# Patient Record
Sex: FEMALE | HISPANIC OR LATINO | Employment: FULL TIME | URBAN - METROPOLITAN AREA
[De-identification: names, ages, dates, MRNs, and addresses within clinical notes are randomized per-mention and may not be internally consistent; named-entity substitution may affect disease eponyms.]

---

## 2024-06-15 ENCOUNTER — APPOINTMENT (OUTPATIENT)
Dept: RADIOLOGY | Facility: HOSPITAL | Age: 24
End: 2024-06-15
Payer: MEDICARE

## 2024-06-15 ENCOUNTER — HOSPITAL ENCOUNTER (EMERGENCY)
Facility: HOSPITAL | Age: 24
Discharge: HOME | End: 2024-06-16
Attending: EMERGENCY MEDICINE
Payer: MEDICARE
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
## 2024-06-15 DIAGNOSIS — S52.301A CLOSED FRACTURE OF SHAFT OF RIGHT RADIUS, UNSPECIFIED FRACTURE MORPHOLOGY, INITIAL ENCOUNTER: Primary | ICD-10-CM

## 2024-06-15 LAB
ANION GAP BLDV CALCULATED.4IONS-SCNC: 12 MMOL/L (ref 10–25)
ANION GAP BLDV CALCULATED.4IONS-SCNC: 12 MMOL/L (ref 10–25)
BASE EXCESS BLDV CALC-SCNC: -2.3 MMOL/L (ref -2–3)
BASE EXCESS BLDV CALC-SCNC: -2.3 MMOL/L (ref -2–3)
BASOPHILS # BLD AUTO: 0.04 X10*3/UL (ref 0–0.1)
BASOPHILS # BLD AUTO: 0.04 X10*3/UL (ref 0–0.1)
BASOPHILS NFR BLD AUTO: 0.6 %
BASOPHILS NFR BLD AUTO: 0.6 %
BODY TEMPERATURE: 37 DEGREES CELSIUS
BODY TEMPERATURE: 37 DEGREES CELSIUS
CA-I BLDV-SCNC: 1.17 MMOL/L (ref 1.1–1.33)
CA-I BLDV-SCNC: 1.17 MMOL/L (ref 1.1–1.33)
CHLORIDE BLDV-SCNC: 108 MMOL/L (ref 98–107)
CHLORIDE BLDV-SCNC: 108 MMOL/L (ref 98–107)
EOSINOPHIL # BLD AUTO: 0.13 X10*3/UL (ref 0–0.7)
EOSINOPHIL # BLD AUTO: 0.13 X10*3/UL (ref 0–0.7)
EOSINOPHIL NFR BLD AUTO: 1.8 %
EOSINOPHIL NFR BLD AUTO: 1.8 %
ERYTHROCYTE [DISTWIDTH] IN BLOOD BY AUTOMATED COUNT: 15.9 % (ref 11.5–14.5)
ERYTHROCYTE [DISTWIDTH] IN BLOOD BY AUTOMATED COUNT: 15.9 % (ref 11.5–14.5)
GLUCOSE BLDV-MCNC: 122 MG/DL (ref 74–99)
GLUCOSE BLDV-MCNC: 122 MG/DL (ref 74–99)
HCO3 BLDV-SCNC: 22.4 MMOL/L (ref 22–26)
HCO3 BLDV-SCNC: 22.4 MMOL/L (ref 22–26)
HCT VFR BLD AUTO: 29.1 % (ref 36–46)
HCT VFR BLD AUTO: 29.1 % (ref 36–46)
HCT VFR BLD EST: 30 % (ref 36–46)
HCT VFR BLD EST: 30 % (ref 36–46)
HGB BLD-MCNC: 9.8 G/DL (ref 12–16)
HGB BLD-MCNC: 9.8 G/DL (ref 12–16)
HGB BLDV-MCNC: 10.1 G/DL (ref 12–16)
HGB BLDV-MCNC: 10.1 G/DL (ref 12–16)
IMM GRANULOCYTES # BLD AUTO: 0.02 X10*3/UL (ref 0–0.7)
IMM GRANULOCYTES # BLD AUTO: 0.02 X10*3/UL (ref 0–0.7)
IMM GRANULOCYTES NFR BLD AUTO: 0.3 % (ref 0–0.9)
IMM GRANULOCYTES NFR BLD AUTO: 0.3 % (ref 0–0.9)
LACTATE BLDV-SCNC: 1.4 MMOL/L (ref 0.4–2)
LACTATE BLDV-SCNC: 1.4 MMOL/L (ref 0.4–2)
LYMPHOCYTES # BLD AUTO: 3.88 X10*3/UL (ref 1.2–4.8)
LYMPHOCYTES # BLD AUTO: 3.88 X10*3/UL (ref 1.2–4.8)
LYMPHOCYTES NFR BLD AUTO: 53.7 %
LYMPHOCYTES NFR BLD AUTO: 53.7 %
MCH RBC QN AUTO: 25.5 PG (ref 26–34)
MCH RBC QN AUTO: 25.5 PG (ref 26–34)
MCHC RBC AUTO-ENTMCNC: 33.7 G/DL (ref 32–36)
MCHC RBC AUTO-ENTMCNC: 33.7 G/DL (ref 32–36)
MCV RBC AUTO: 76 FL (ref 80–100)
MCV RBC AUTO: 76 FL (ref 80–100)
MONOCYTES # BLD AUTO: 0.43 X10*3/UL (ref 0.1–1)
MONOCYTES # BLD AUTO: 0.43 X10*3/UL (ref 0.1–1)
MONOCYTES NFR BLD AUTO: 6 %
MONOCYTES NFR BLD AUTO: 6 %
NEUTROPHILS # BLD AUTO: 2.72 X10*3/UL (ref 1.2–7.7)
NEUTROPHILS # BLD AUTO: 2.72 X10*3/UL (ref 1.2–7.7)
NEUTROPHILS NFR BLD AUTO: 37.6 %
NEUTROPHILS NFR BLD AUTO: 37.6 %
NRBC BLD-RTO: 0 /100 WBCS (ref 0–0)
NRBC BLD-RTO: 0 /100 WBCS (ref 0–0)
OXYHGB MFR BLDV: 71 % (ref 45–75)
OXYHGB MFR BLDV: 71 % (ref 45–75)
PCO2 BLDV: 37 MM HG (ref 41–51)
PCO2 BLDV: 37 MM HG (ref 41–51)
PH BLDV: 7.39 PH (ref 7.33–7.43)
PH BLDV: 7.39 PH (ref 7.33–7.43)
PLATELET # BLD AUTO: 262 X10*3/UL (ref 150–450)
PLATELET # BLD AUTO: 262 X10*3/UL (ref 150–450)
PO2 BLDV: 44 MM HG (ref 35–45)
PO2 BLDV: 44 MM HG (ref 35–45)
POTASSIUM BLDV-SCNC: 3.7 MMOL/L (ref 3.5–5.3)
POTASSIUM BLDV-SCNC: 3.7 MMOL/L (ref 3.5–5.3)
RBC # BLD AUTO: 3.84 X10*6/UL (ref 4–5.2)
RBC # BLD AUTO: 3.84 X10*6/UL (ref 4–5.2)
SAO2 % BLDV: 72 % (ref 45–75)
SAO2 % BLDV: 72 % (ref 45–75)
SODIUM BLDV-SCNC: 139 MMOL/L (ref 136–145)
SODIUM BLDV-SCNC: 139 MMOL/L (ref 136–145)
WBC # BLD AUTO: 7.2 X10*3/UL (ref 4.4–11.3)
WBC # BLD AUTO: 7.2 X10*3/UL (ref 4.4–11.3)

## 2024-06-15 PROCEDURE — G0390 TRAUMA RESPONS W/HOSP CRITI: HCPCS

## 2024-06-15 PROCEDURE — 25605 CLTX DST RDL FX/EPHYS SEP W/: CPT | Mod: RT

## 2024-06-15 PROCEDURE — 84702 CHORIONIC GONADOTROPIN TEST: CPT | Performed by: EMERGENCY MEDICINE

## 2024-06-15 PROCEDURE — 72128 CT CHEST SPINE W/O DYE: CPT | Performed by: RADIOLOGY

## 2024-06-15 PROCEDURE — 82435 ASSAY OF BLOOD CHLORIDE: CPT

## 2024-06-15 PROCEDURE — 82077 ASSAY SPEC XCP UR&BREATH IA: CPT | Performed by: EMERGENCY MEDICINE

## 2024-06-15 PROCEDURE — 71045 X-RAY EXAM CHEST 1 VIEW: CPT | Performed by: RADIOLOGY

## 2024-06-15 PROCEDURE — 36415 COLL VENOUS BLD VENIPUNCTURE: CPT | Performed by: EMERGENCY MEDICINE

## 2024-06-15 PROCEDURE — 72125 CT NECK SPINE W/O DYE: CPT

## 2024-06-15 PROCEDURE — 83605 ASSAY OF LACTIC ACID: CPT | Performed by: EMERGENCY MEDICINE

## 2024-06-15 PROCEDURE — 72131 CT LUMBAR SPINE W/O DYE: CPT | Performed by: RADIOLOGY

## 2024-06-15 PROCEDURE — 84075 ASSAY ALKALINE PHOSPHATASE: CPT | Performed by: EMERGENCY MEDICINE

## 2024-06-15 PROCEDURE — 84132 ASSAY OF SERUM POTASSIUM: CPT

## 2024-06-15 PROCEDURE — 86901 BLOOD TYPING SEROLOGIC RH(D): CPT | Performed by: EMERGENCY MEDICINE

## 2024-06-15 PROCEDURE — 84132 ASSAY OF SERUM POTASSIUM: CPT | Performed by: EMERGENCY MEDICINE

## 2024-06-15 PROCEDURE — 70450 CT HEAD/BRAIN W/O DYE: CPT

## 2024-06-15 PROCEDURE — 99285 EMERGENCY DEPT VISIT HI MDM: CPT | Mod: 25

## 2024-06-15 PROCEDURE — 80053 COMPREHEN METABOLIC PANEL: CPT

## 2024-06-15 PROCEDURE — 73090 X-RAY EXAM OF FOREARM: CPT | Mod: RT

## 2024-06-15 PROCEDURE — 99285 EMERGENCY DEPT VISIT HI MDM: CPT | Performed by: EMERGENCY MEDICINE

## 2024-06-15 PROCEDURE — 72125 CT NECK SPINE W/O DYE: CPT | Performed by: RADIOLOGY

## 2024-06-15 PROCEDURE — 2500000004 HC RX 250 GENERAL PHARMACY W/ HCPCS (ALT 636 FOR OP/ED): Performed by: EMERGENCY MEDICINE

## 2024-06-15 PROCEDURE — 2550000001 HC RX 255 CONTRASTS: Performed by: EMERGENCY MEDICINE

## 2024-06-15 PROCEDURE — 71260 CT THORAX DX C+: CPT | Performed by: RADIOLOGY

## 2024-06-15 PROCEDURE — 85025 COMPLETE CBC W/AUTO DIFF WBC: CPT | Performed by: EMERGENCY MEDICINE

## 2024-06-15 PROCEDURE — 72131 CT LUMBAR SPINE W/O DYE: CPT

## 2024-06-15 PROCEDURE — 72128 CT CHEST SPINE W/O DYE: CPT

## 2024-06-15 PROCEDURE — 73100 X-RAY EXAM OF WRIST: CPT | Mod: RIGHT SIDE | Performed by: RADIOLOGY

## 2024-06-15 PROCEDURE — 70450 CT HEAD/BRAIN W/O DYE: CPT | Performed by: RADIOLOGY

## 2024-06-15 PROCEDURE — 96374 THER/PROPH/DIAG INJ IV PUSH: CPT

## 2024-06-15 PROCEDURE — 71045 X-RAY EXAM CHEST 1 VIEW: CPT

## 2024-06-15 PROCEDURE — 82947 ASSAY GLUCOSE BLOOD QUANT: CPT | Performed by: EMERGENCY MEDICINE

## 2024-06-15 PROCEDURE — 72170 X-RAY EXAM OF PELVIS: CPT | Performed by: RADIOLOGY

## 2024-06-15 PROCEDURE — 73090 X-RAY EXAM OF FOREARM: CPT | Mod: RIGHT SIDE | Performed by: RADIOLOGY

## 2024-06-15 PROCEDURE — 74177 CT ABD & PELVIS W/CONTRAST: CPT | Performed by: RADIOLOGY

## 2024-06-15 PROCEDURE — 74177 CT ABD & PELVIS W/CONTRAST: CPT

## 2024-06-15 PROCEDURE — 96361 HYDRATE IV INFUSION ADD-ON: CPT

## 2024-06-15 PROCEDURE — 73100 X-RAY EXAM OF WRIST: CPT | Mod: RT

## 2024-06-15 PROCEDURE — 72170 X-RAY EXAM OF PELVIS: CPT

## 2024-06-15 RX ORDER — FENTANYL CITRATE 50 UG/ML
INJECTION, SOLUTION INTRAMUSCULAR; INTRAVENOUS
Status: COMPLETED
Start: 2024-06-15 | End: 2024-06-16

## 2024-06-15 RX ORDER — FENTANYL CITRATE 50 UG/ML
INJECTION, SOLUTION INTRAMUSCULAR; INTRAVENOUS CODE/TRAUMA/SEDATION MEDICATION
Status: COMPLETED | OUTPATIENT
Start: 2024-06-15 | End: 2024-06-15

## 2024-06-15 RX ORDER — FENTANYL CITRATE 50 UG/ML
50 INJECTION, SOLUTION INTRAMUSCULAR; INTRAVENOUS ONCE
Status: COMPLETED | OUTPATIENT
Start: 2024-06-15 | End: 2024-06-16

## 2024-06-15 RX ADMIN — IOHEXOL 100 ML: 350 INJECTION, SOLUTION INTRAVENOUS at 23:26

## 2024-06-15 RX ADMIN — SODIUM CHLORIDE, SODIUM LACTATE, POTASSIUM CHLORIDE, AND CALCIUM CHLORIDE 1000 ML: 600; 310; 30; 20 INJECTION, SOLUTION INTRAVENOUS at 23:11

## 2024-06-15 RX ADMIN — FENTANYL CITRATE 50 MCG: 50 INJECTION, SOLUTION INTRAMUSCULAR; INTRAVENOUS at 23:19

## 2024-06-15 NOTE — Clinical Note
Chyna Jaramillo was seen and treated in our emergency department on 6/15/2024.  She may return to work on 07/01/2024.  Should not return to work or use right hand until cleared by an orthopedic surgeon     If you have any questions or concerns, please don't hesitate to call.      Prince Dunn MD

## 2024-06-16 ENCOUNTER — APPOINTMENT (OUTPATIENT)
Dept: RADIOLOGY | Facility: HOSPITAL | Age: 24
End: 2024-06-16
Payer: MEDICARE

## 2024-06-16 VITALS
RESPIRATION RATE: 17 BRPM | RESPIRATION RATE: 17 BRPM | HEART RATE: 71 BPM | SYSTOLIC BLOOD PRESSURE: 95 MMHG | OXYGEN SATURATION: 100 % | DIASTOLIC BLOOD PRESSURE: 51 MMHG | SYSTOLIC BLOOD PRESSURE: 95 MMHG | DIASTOLIC BLOOD PRESSURE: 51 MMHG | TEMPERATURE: 97.8 F | HEART RATE: 71 BPM | OXYGEN SATURATION: 100 % | TEMPERATURE: 97.8 F

## 2024-06-16 LAB
ABO GROUP (TYPE) IN BLOOD: NORMAL
ABO GROUP (TYPE) IN BLOOD: NORMAL
ALBUMIN SERPL BCP-MCNC: 3.9 G/DL (ref 3.4–5)
ALBUMIN SERPL BCP-MCNC: 3.9 G/DL (ref 3.4–5)
ALP SERPL-CCNC: 48 U/L (ref 33–110)
ALP SERPL-CCNC: 48 U/L (ref 33–110)
ALT SERPL W P-5'-P-CCNC: 9 U/L (ref 7–45)
ALT SERPL W P-5'-P-CCNC: 9 U/L (ref 7–45)
ANION GAP SERPL CALC-SCNC: 13 MMOL/L (ref 10–20)
ANION GAP SERPL CALC-SCNC: 13 MMOL/L (ref 10–20)
ANTIBODY SCREEN: NORMAL
ANTIBODY SCREEN: NORMAL
AST SERPL W P-5'-P-CCNC: 17 U/L (ref 9–39)
AST SERPL W P-5'-P-CCNC: 17 U/L (ref 9–39)
B-HCG SERPL-ACNC: <3 MIU/ML
B-HCG SERPL-ACNC: <3 MIU/ML
BILIRUB SERPL-MCNC: 0.4 MG/DL (ref 0–1.2)
BILIRUB SERPL-MCNC: 0.4 MG/DL (ref 0–1.2)
BUN SERPL-MCNC: 12 MG/DL (ref 6–23)
BUN SERPL-MCNC: 12 MG/DL (ref 6–23)
CALCIUM SERPL-MCNC: 8.5 MG/DL (ref 8.6–10.6)
CALCIUM SERPL-MCNC: 8.5 MG/DL (ref 8.6–10.6)
CHLORIDE SERPL-SCNC: 110 MMOL/L (ref 98–107)
CHLORIDE SERPL-SCNC: 110 MMOL/L (ref 98–107)
CO2 SERPL-SCNC: 20 MMOL/L (ref 21–32)
CO2 SERPL-SCNC: 20 MMOL/L (ref 21–32)
CREAT SERPL-MCNC: 0.68 MG/DL (ref 0.5–1.05)
CREAT SERPL-MCNC: 0.68 MG/DL (ref 0.5–1.05)
EGFRCR SERPLBLD CKD-EPI 2021: >90 ML/MIN/1.73M*2
EGFRCR SERPLBLD CKD-EPI 2021: >90 ML/MIN/1.73M*2
ETHANOL SERPL-MCNC: <10 MG/DL
ETHANOL SERPL-MCNC: <10 MG/DL
GLUCOSE SERPL-MCNC: 124 MG/DL (ref 74–99)
GLUCOSE SERPL-MCNC: 124 MG/DL (ref 74–99)
INR PPP: 1.3 (ref 0.9–1.1)
INR PPP: 1.3 (ref 0.9–1.1)
LACTATE SERPL-SCNC: 1.5 MMOL/L (ref 0.4–2)
LACTATE SERPL-SCNC: 1.5 MMOL/L (ref 0.4–2)
POTASSIUM SERPL-SCNC: 3.6 MMOL/L (ref 3.5–5.3)
POTASSIUM SERPL-SCNC: 3.6 MMOL/L (ref 3.5–5.3)
PROT SERPL-MCNC: 6.6 G/DL (ref 6.4–8.2)
PROT SERPL-MCNC: 6.6 G/DL (ref 6.4–8.2)
PROTHROMBIN TIME: 14.5 SECONDS (ref 9.8–12.8)
PROTHROMBIN TIME: 14.5 SECONDS (ref 9.8–12.8)
RH FACTOR (ANTIGEN D): NORMAL
RH FACTOR (ANTIGEN D): NORMAL
SODIUM SERPL-SCNC: 139 MMOL/L (ref 136–145)
SODIUM SERPL-SCNC: 139 MMOL/L (ref 136–145)

## 2024-06-16 PROCEDURE — 73564 X-RAY EXAM KNEE 4 OR MORE: CPT | Mod: RT

## 2024-06-16 PROCEDURE — 25605 CLTX DST RDL FX/EPHYS SEP W/: CPT | Mod: RT

## 2024-06-16 PROCEDURE — 96376 TX/PRO/DX INJ SAME DRUG ADON: CPT

## 2024-06-16 PROCEDURE — 85610 PROTHROMBIN TIME: CPT | Performed by: EMERGENCY MEDICINE

## 2024-06-16 PROCEDURE — 36415 COLL VENOUS BLD VENIPUNCTURE: CPT | Performed by: EMERGENCY MEDICINE

## 2024-06-16 PROCEDURE — 2500000005 HC RX 250 GENERAL PHARMACY W/O HCPCS: Performed by: STUDENT IN AN ORGANIZED HEALTH CARE EDUCATION/TRAINING PROGRAM

## 2024-06-16 PROCEDURE — 73120 X-RAY EXAM OF HAND: CPT | Mod: RT

## 2024-06-16 PROCEDURE — 2500000004 HC RX 250 GENERAL PHARMACY W/ HCPCS (ALT 636 FOR OP/ED): Performed by: STUDENT IN AN ORGANIZED HEALTH CARE EDUCATION/TRAINING PROGRAM

## 2024-06-16 PROCEDURE — 2500000001 HC RX 250 WO HCPCS SELF ADMINISTERED DRUGS (ALT 637 FOR MEDICARE OP)

## 2024-06-16 PROCEDURE — 2500000004 HC RX 250 GENERAL PHARMACY W/ HCPCS (ALT 636 FOR OP/ED): Performed by: EMERGENCY MEDICINE

## 2024-06-16 PROCEDURE — 73564 X-RAY EXAM KNEE 4 OR MORE: CPT | Mod: RIGHT SIDE | Performed by: RADIOLOGY

## 2024-06-16 PROCEDURE — 96375 TX/PRO/DX INJ NEW DRUG ADDON: CPT

## 2024-06-16 PROCEDURE — 73070 X-RAY EXAM OF ELBOW: CPT | Mod: RT

## 2024-06-16 PROCEDURE — 73110 X-RAY EXAM OF WRIST: CPT | Mod: RT

## 2024-06-16 PROCEDURE — 73070 X-RAY EXAM OF ELBOW: CPT | Mod: RIGHT SIDE | Performed by: RADIOLOGY

## 2024-06-16 PROCEDURE — 99283 EMERGENCY DEPT VISIT LOW MDM: CPT

## 2024-06-16 RX ORDER — HYDROMORPHONE HYDROCHLORIDE 1 MG/ML
0.5 INJECTION, SOLUTION INTRAMUSCULAR; INTRAVENOUS; SUBCUTANEOUS ONCE
Status: COMPLETED | OUTPATIENT
Start: 2024-06-16 | End: 2024-06-16

## 2024-06-16 RX ORDER — OXYCODONE HYDROCHLORIDE 5 MG/1
5 TABLET ORAL EVERY 6 HOURS PRN
Qty: 12 TABLET | Refills: 0 | Status: SHIPPED | OUTPATIENT
Start: 2024-06-16 | End: 2024-06-16

## 2024-06-16 RX ORDER — OXYCODONE HYDROCHLORIDE 5 MG/1
TABLET ORAL
Status: COMPLETED
Start: 2024-06-16 | End: 2024-06-16

## 2024-06-16 RX ORDER — OXYCODONE HYDROCHLORIDE 5 MG/1
5 TABLET ORAL ONCE
Status: COMPLETED | OUTPATIENT
Start: 2024-06-16 | End: 2024-06-16

## 2024-06-16 RX ORDER — LIDOCAINE HYDROCHLORIDE 10 MG/ML
20 INJECTION INFILTRATION; PERINEURAL ONCE
Status: COMPLETED | OUTPATIENT
Start: 2024-06-16 | End: 2024-06-16

## 2024-06-16 RX ORDER — OXYCODONE HYDROCHLORIDE 5 MG/1
5 TABLET ORAL EVERY 6 HOURS PRN
Qty: 12 TABLET | Refills: 0 | Status: SHIPPED | OUTPATIENT
Start: 2024-06-16 | End: 2024-06-19

## 2024-06-16 RX ORDER — ACETAMINOPHEN 325 MG/1
975 TABLET ORAL ONCE
Status: COMPLETED | OUTPATIENT
Start: 2024-06-16 | End: 2024-06-16

## 2024-06-16 RX ORDER — ACETAMINOPHEN 325 MG/1
TABLET ORAL
Status: COMPLETED
Start: 2024-06-16 | End: 2024-06-16

## 2024-06-16 RX ADMIN — OXYCODONE HYDROCHLORIDE 5 MG: 5 TABLET ORAL at 04:51

## 2024-06-16 RX ADMIN — SODIUM CHLORIDE, POTASSIUM CHLORIDE, SODIUM LACTATE AND CALCIUM CHLORIDE 1000 ML: 600; 310; 30; 20 INJECTION, SOLUTION INTRAVENOUS at 00:05

## 2024-06-16 RX ADMIN — ACETAMINOPHEN 975 MG: 325 TABLET ORAL at 04:50

## 2024-06-16 RX ADMIN — LIDOCAINE HYDROCHLORIDE 200 MG: 10 INJECTION, SOLUTION INFILTRATION; PERINEURAL at 00:34

## 2024-06-16 RX ADMIN — FENTANYL CITRATE 50 MCG: 50 INJECTION, SOLUTION INTRAMUSCULAR; INTRAVENOUS at 00:03

## 2024-06-16 RX ADMIN — HYDROMORPHONE HYDROCHLORIDE 0.5 MG: 1 INJECTION, SOLUTION INTRAMUSCULAR; INTRAVENOUS; SUBCUTANEOUS at 00:34

## 2024-06-16 ASSESSMENT — ENCOUNTER SYMPTOMS
SHORTNESS OF BREATH: 0
CONFUSION: 0
RHINORRHEA: 0
ABDOMINAL DISTENTION: 0
EYE DISCHARGE: 0
ABDOMINAL PAIN: 0
AGITATION: 0
EYE PAIN: 0
CHEST TIGHTNESS: 0
NECK PAIN: 0
BACK PAIN: 0

## 2024-06-16 ASSESSMENT — PAIN DESCRIPTION - PROGRESSION: CLINICAL_PROGRESSION: GRADUALLY WORSENING

## 2024-06-16 ASSESSMENT — COLUMBIA-SUICIDE SEVERITY RATING SCALE - C-SSRS
2. HAVE YOU ACTUALLY HAD ANY THOUGHTS OF KILLING YOURSELF?: NO
1. IN THE PAST MONTH, HAVE YOU WISHED YOU WERE DEAD OR WISHED YOU COULD GO TO SLEEP AND NOT WAKE UP?: NO
6. HAVE YOU EVER DONE ANYTHING, STARTED TO DO ANYTHING, OR PREPARED TO DO ANYTHING TO END YOUR LIFE?: NO

## 2024-06-16 ASSESSMENT — PAIN DESCRIPTION - LOCATION: LOCATION: WRIST

## 2024-06-16 ASSESSMENT — LIFESTYLE VARIABLES
TOTAL SCORE: 0
HAVE PEOPLE ANNOYED YOU BY CRITICIZING YOUR DRINKING: NO
EVER HAD A DRINK FIRST THING IN THE MORNING TO STEADY YOUR NERVES TO GET RID OF A HANGOVER: NO
EVER FELT BAD OR GUILTY ABOUT YOUR DRINKING: NO
HAVE YOU EVER FELT YOU SHOULD CUT DOWN ON YOUR DRINKING: NO

## 2024-06-16 ASSESSMENT — PAIN SCALES - GENERAL: PAINLEVEL_OUTOF10: 8

## 2024-06-16 ASSESSMENT — PAIN - FUNCTIONAL ASSESSMENT: PAIN_FUNCTIONAL_ASSESSMENT: 0-10

## 2024-06-16 ASSESSMENT — PAIN DESCRIPTION - ORIENTATION: ORIENTATION: RIGHT
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
## 2024-06-16 NOTE — PROGRESS NOTES
Clinical Event Note    Patient had been discharged from the [facility] ED overnight last night. Patient called the ED today reporting that their pharmacy claimed they never received the script for oxycodone that had been written for the patient upon discharge. I reviewed the EMR and saw that Dr. Prince Dunn had electronically prescribed the patient a course of oxycodone IR 5 mg that had been sent to [facility] [address] [place] [address] Road this morning at 4:02 AM. I called and spoke with the [facility] and they stated that they had not received any electronic prescriptions on the patient. Because they had not received any scripts, I resubmitted the script for oxycodone electronically and the [facility] confirmed while I was still on the phone that they had received my script electronically.    Joseline Pressley MD  ED Attending
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
## 2024-06-28 ENCOUNTER — HOSPITAL ENCOUNTER (OUTPATIENT)
Dept: RADIOLOGY | Facility: CLINIC | Age: 24
Discharge: HOME | End: 2024-06-28
Payer: COMMERCIAL

## 2024-06-28 ENCOUNTER — OFFICE VISIT (OUTPATIENT)
Dept: ORTHOPEDIC SURGERY | Facility: CLINIC | Age: 24
End: 2024-06-28
Payer: COMMERCIAL

## 2024-06-28 ENCOUNTER — APPOINTMENT (OUTPATIENT)
Dept: ORTHOPEDIC SURGERY | Facility: CLINIC | Age: 24
End: 2024-06-28
Payer: COMMERCIAL

## 2024-06-28 DIAGNOSIS — S52.501A CLOSED FRACTURE OF DISTAL ENDS OF RIGHT RADIUS AND ULNA, INITIAL ENCOUNTER: Primary | ICD-10-CM

## 2024-06-28 DIAGNOSIS — S52.601A CLOSED FRACTURE OF DISTAL ENDS OF RIGHT RADIUS AND ULNA, INITIAL ENCOUNTER: ICD-10-CM

## 2024-06-28 DIAGNOSIS — S52.601A CLOSED FRACTURE OF DISTAL ENDS OF RIGHT RADIUS AND ULNA, INITIAL ENCOUNTER: Primary | ICD-10-CM

## 2024-06-28 DIAGNOSIS — S52.501A CLOSED FRACTURE OF DISTAL ENDS OF RIGHT RADIUS AND ULNA, INITIAL ENCOUNTER: ICD-10-CM

## 2024-06-28 PROCEDURE — 73110 X-RAY EXAM OF WRIST: CPT | Mod: RT

## 2024-06-28 PROCEDURE — 99203 OFFICE O/P NEW LOW 30 MIN: CPT | Performed by: STUDENT IN AN ORGANIZED HEALTH CARE EDUCATION/TRAINING PROGRAM

## 2024-06-28 ASSESSMENT — PAIN SCALES - GENERAL: PAINLEVEL_OUTOF10: 0 - NO PAIN

## 2024-06-28 ASSESSMENT — PAIN DESCRIPTION - DESCRIPTORS: DESCRIPTORS: ACHING

## 2024-06-28 ASSESSMENT — PAIN - FUNCTIONAL ASSESSMENT: PAIN_FUNCTIONAL_ASSESSMENT: 0-10
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
## 2024-07-01 DIAGNOSIS — S52.601A CLOSED FRACTURE OF RIGHT DISTAL RADIUS AND ULNA, INITIAL ENCOUNTER: Primary | ICD-10-CM

## 2024-07-01 DIAGNOSIS — S52.501A CLOSED FRACTURE OF RIGHT DISTAL RADIUS AND ULNA, INITIAL ENCOUNTER: Primary | ICD-10-CM

## 2024-07-02 ENCOUNTER — HOSPITAL ENCOUNTER (OUTPATIENT)
Dept: RADIOLOGY | Facility: CLINIC | Age: 24
Discharge: HOME | End: 2024-07-02
Payer: COMMERCIAL

## 2024-07-02 ENCOUNTER — CLINICAL SUPPORT (OUTPATIENT)
Dept: PREADMISSION TESTING | Facility: HOSPITAL | Age: 24
End: 2024-07-02
Payer: COMMERCIAL

## 2024-07-02 VITALS — HEIGHT: 67 IN | WEIGHT: 120 LBS | BODY MASS INDEX: 18.83 KG/M2

## 2024-07-02 DIAGNOSIS — S52.501A CLOSED FRACTURE OF RIGHT DISTAL RADIUS AND ULNA, INITIAL ENCOUNTER: ICD-10-CM

## 2024-07-02 DIAGNOSIS — S52.601A CLOSED FRACTURE OF RIGHT DISTAL RADIUS AND ULNA, INITIAL ENCOUNTER: ICD-10-CM

## 2024-07-02 PROCEDURE — 73200 CT UPPER EXTREMITY W/O DYE: CPT | Mod: RIGHT SIDE | Performed by: RADIOLOGY

## 2024-07-02 PROCEDURE — 73200 CT UPPER EXTREMITY W/O DYE: CPT | Mod: RT

## 2024-07-02 NOTE — PERIOPERATIVE NURSING NOTE
PAT nurse screening call completed; chart updated per information provided by patient. Education/ instructions reviewed; patient denied further questions or concerns. Patient aware of upcoming OR date on 7-5-24

## 2024-07-02 NOTE — PREPROCEDURE INSTRUCTIONS
No outpatient medications have been marked as taking for the 7/2/24 encounter (Clinical Support) with [facility].                       NPO Instructions:    Do not eat any food after midnight the night before your surgery/procedure.    Additional Instructions:     Three Days before Surgery:  Review your medication instructions, stop indicated medications  The Day before Surgery:  Review your medication instructions, stop indicated medications  You will be contacted regarding the time of your arrival to facility and surgery time  Do not eat any food after Midnight  Day of Surgery:  Review your medication instructions, take indicated medications  Wear  comfortable loose fitting clothing  Do not use moisturizers, creams, lotions or perfume  All jewelry and valuables should be left at home    PAT PRE-OPERATIVE INSTRUCTIONS    [facility]  [address] [place] [address]  [phone]    Please let your surgeon know if:      You develop:  Open sores, shingles, burning or painful urination as these may increase your risk of an infection.   Fever=100.4 or greater   New or worsening cold or flu symptoms ( cough, shortness of breath, sore throat, respiratory distress, headache, fatigue, GI symptoms)   You no longer wish to have the surgery.   Any other personal circumstances change that may lead to the need to cancel or defer this surgery-such as being sick or getting admitted to any hospital within one week of your planned procedure.    Your contact details change, such as a change of address or phone number.    Starting now:     Please DO NOT drink alcohol or smoke for 24 hours before surgery. It is well known that quitting smoking can make a huge difference to your health and recovery from surgery. The longer you abstain from smoking, the better your chances of a healthy recovery. If you need help with quitting, call 1-800-QUIT-NOW to be connected to a trained counselor who  will discuss the best methods to help you quit.     Before your surgery:    Please stop all supplements/ vitamins 7 days prior to surgery (or as directed by your surgeon).   Please stop taking NSAID pain medicine such as Advil, Ibuprofen, and Motrin 7 days before surgery.    If you develop any fever, cough, cold, rashes, cuts, scratches, scrapes, urinary symptoms or infection anywhere on your body (including teeth and gums) prior to surgery, please call your surgeon’s office as soon as possible. This may require treatment to reduce the chance of cancellation on the day of surgery.    The day before your surgery:   DIET- Do not eat any food after MIDNIGHT.   Get a good night’s rest.  Use the special soap for bathing if you have been instructed to use one.    Scheduled surgery times may change and you will be notified if this occurs - please check your personal voicemail for any updates.     On the morning of surgery:   Wear comfortable, loose fitting clothes which open in the front.   Shower and please do not wear moisturizers, creams, lotions, deodorants, makeup or perfume.    Please bring with you to surgery:   Photo ID and insurance card   Current list of medicines and allergies   Pacemaker/ Defibrillator/Heart stent cards as well as remote controls for implanted devices    CPAP machine and mask    Slings/ splints/ crutches   A copy of your complete advanced directive/DHPOA.    Please do NOT bring with you to surgery:   All jewelry and valuables should be left at home.   Prosthetic devices such as contact lenses, glasses, hearing aids, dentures, eyelash extensions, hairpins and body piercings must be removed prior to going in to the surgical suite. If you have a case for these items, please bring it with you on surgery day.    *Patients under the age of 18: A responsible adult must be present and remaining in the building throughout the surgical visit.    After outpatient surgery:   A responsible adult MUST  accompany you at the time of discharge and stay with you for 24 hours after your surgery. You may NOT drive yourself home after surgery.    Do not drive, operate machinery, make critical decisions or do activities that require co-ordination or balance until after a night’s sleep.   Do not drink alcoholic beverages for 24 hours.   Instructions for resuming your medications will be provided by your surgeon.    CALL YOUR DOCTOR AFTER SURGERY IF YOU HAVE:     Chills and/or a fever of 101° F or higher.    Redness, swelling, pus or drainage from your surgical wound or a bad smell from the wound.    Lightheadedness, fainting or confusion.    Persistent vomiting (throwing up) and are not able to eat or drink for 12 hours.    Three or more loose, watery bowel movements in 24 hours (diarrhea).   Difficulty or pain while urinating( after non-urological surgery)    Pain and swelling in your legs, especially if it is only on one side.    Difficulty breathing or are breathing faster than normal.    Any new concerning symptoms.      Reviewed pre-op instructions with patient, states understanding and denies further questions at this time.      Take Care Chyna

## 2024-07-05 ENCOUNTER — APPOINTMENT (OUTPATIENT)
Dept: RADIOLOGY | Facility: HOSPITAL | Age: 24
End: 2024-07-05
Payer: COMMERCIAL

## 2024-07-05 ENCOUNTER — ANESTHESIA EVENT (OUTPATIENT)
Dept: OPERATING ROOM | Facility: HOSPITAL | Age: 24
End: 2024-07-05
Payer: COMMERCIAL

## 2024-07-05 ENCOUNTER — ANESTHESIA (OUTPATIENT)
Dept: OPERATING ROOM | Facility: HOSPITAL | Age: 24
End: 2024-07-05
Payer: COMMERCIAL

## 2024-07-05 ENCOUNTER — HOSPITAL ENCOUNTER (OUTPATIENT)
Facility: HOSPITAL | Age: 24
Setting detail: OUTPATIENT SURGERY
Discharge: HOME | End: 2024-07-05
Attending: STUDENT IN AN ORGANIZED HEALTH CARE EDUCATION/TRAINING PROGRAM | Admitting: STUDENT IN AN ORGANIZED HEALTH CARE EDUCATION/TRAINING PROGRAM
Payer: COMMERCIAL

## 2024-07-05 VITALS
RESPIRATION RATE: 15 BRPM | SYSTOLIC BLOOD PRESSURE: 116 MMHG | TEMPERATURE: 97.3 F | DIASTOLIC BLOOD PRESSURE: 66 MMHG | HEART RATE: 92 BPM | OXYGEN SATURATION: 99 % | BODY MASS INDEX: 18.51 KG/M2 | HEIGHT: 67 IN | WEIGHT: 117.9 LBS

## 2024-07-05 DIAGNOSIS — S52.501A CLOSED FRACTURE OF RIGHT DISTAL RADIUS AND ULNA, INITIAL ENCOUNTER: Primary | ICD-10-CM

## 2024-07-05 DIAGNOSIS — S52.601A CLOSED FRACTURE OF RIGHT DISTAL RADIUS AND ULNA, INITIAL ENCOUNTER: Primary | ICD-10-CM

## 2024-07-05 LAB — HCG UR QL IA.RAPID: NEGATIVE

## 2024-07-05 PROCEDURE — 25609 OPTX DST RD XART FX/EP SEP3+: CPT | Performed by: STUDENT IN AN ORGANIZED HEALTH CARE EDUCATION/TRAINING PROGRAM

## 2024-07-05 PROCEDURE — 7100000002 HC RECOVERY ROOM TIME - EACH INCREMENTAL 1 MINUTE: Performed by: STUDENT IN AN ORGANIZED HEALTH CARE EDUCATION/TRAINING PROGRAM

## 2024-07-05 PROCEDURE — 2500000004 HC RX 250 GENERAL PHARMACY W/ HCPCS (ALT 636 FOR OP/ED): Performed by: STUDENT IN AN ORGANIZED HEALTH CARE EDUCATION/TRAINING PROGRAM

## 2024-07-05 PROCEDURE — 3600000009 HC OR TIME - EACH INCREMENTAL 1 MINUTE - PROCEDURE LEVEL FOUR: Performed by: STUDENT IN AN ORGANIZED HEALTH CARE EDUCATION/TRAINING PROGRAM

## 2024-07-05 PROCEDURE — C1769 GUIDE WIRE: HCPCS | Performed by: STUDENT IN AN ORGANIZED HEALTH CARE EDUCATION/TRAINING PROGRAM

## 2024-07-05 PROCEDURE — 7100000009 HC PHASE TWO TIME - INITIAL BASE CHARGE: Performed by: STUDENT IN AN ORGANIZED HEALTH CARE EDUCATION/TRAINING PROGRAM

## 2024-07-05 PROCEDURE — 3700000002 HC GENERAL ANESTHESIA TIME - EACH INCREMENTAL 1 MINUTE: Performed by: STUDENT IN AN ORGANIZED HEALTH CARE EDUCATION/TRAINING PROGRAM

## 2024-07-05 PROCEDURE — 3700000001 HC GENERAL ANESTHESIA TIME - INITIAL BASE CHARGE: Performed by: STUDENT IN AN ORGANIZED HEALTH CARE EDUCATION/TRAINING PROGRAM

## 2024-07-05 PROCEDURE — C1713 ANCHOR/SCREW BN/BN,TIS/BN: HCPCS | Performed by: STUDENT IN AN ORGANIZED HEALTH CARE EDUCATION/TRAINING PROGRAM

## 2024-07-05 PROCEDURE — 2500000005 HC RX 250 GENERAL PHARMACY W/O HCPCS: Performed by: STUDENT IN AN ORGANIZED HEALTH CARE EDUCATION/TRAINING PROGRAM

## 2024-07-05 PROCEDURE — 2720000007 HC OR 272 NO HCPCS: Performed by: STUDENT IN AN ORGANIZED HEALTH CARE EDUCATION/TRAINING PROGRAM

## 2024-07-05 PROCEDURE — 2780000003 HC OR 278 NO HCPCS: Performed by: STUDENT IN AN ORGANIZED HEALTH CARE EDUCATION/TRAINING PROGRAM

## 2024-07-05 PROCEDURE — 7100000001 HC RECOVERY ROOM TIME - INITIAL BASE CHARGE: Performed by: STUDENT IN AN ORGANIZED HEALTH CARE EDUCATION/TRAINING PROGRAM

## 2024-07-05 PROCEDURE — 81025 URINE PREGNANCY TEST: CPT | Performed by: STUDENT IN AN ORGANIZED HEALTH CARE EDUCATION/TRAINING PROGRAM

## 2024-07-05 PROCEDURE — 7100000010 HC PHASE TWO TIME - EACH INCREMENTAL 1 MINUTE: Performed by: STUDENT IN AN ORGANIZED HEALTH CARE EDUCATION/TRAINING PROGRAM

## 2024-07-05 PROCEDURE — 3600000004 HC OR TIME - INITIAL BASE CHARGE - PROCEDURE LEVEL FOUR: Performed by: STUDENT IN AN ORGANIZED HEALTH CARE EDUCATION/TRAINING PROGRAM

## 2024-07-05 DEVICE — SCREW, VA 2.4 X 16 LOCK STARDRIVE: Type: IMPLANTABLE DEVICE | Site: WRIST | Status: FUNCTIONAL

## 2024-07-05 DEVICE — IMPLANTABLE DEVICE: Type: IMPLANTABLE DEVICE | Site: WRIST | Status: FUNCTIONAL

## 2024-07-05 DEVICE — SCREW, CORTEX SELF TAPPING T8 SDR 2.4MM X 16MM: Type: IMPLANTABLE DEVICE | Site: WRIST | Status: FUNCTIONAL

## 2024-07-05 DEVICE — SCREW, VA 2.4 X 18 LOCK STARDRIVE: Type: IMPLANTABLE DEVICE | Site: WRIST | Status: FUNCTIONAL

## 2024-07-05 DEVICE — SCREW, CORTEX SELF TAPPING T8 SDR 2.4MM X 12MM: Type: IMPLANTABLE DEVICE | Site: WRIST | Status: FUNCTIONAL

## 2024-07-05 DEVICE — GUIDEWIRE, 1.1MM X 150MM. TROCAR TIP: Type: IMPLANTABLE DEVICE | Site: WRIST | Status: FUNCTIONAL

## 2024-07-05 RX ORDER — IPRATROPIUM BROMIDE 0.5 MG/2.5ML
500 SOLUTION RESPIRATORY (INHALATION) AS NEEDED
Status: DISCONTINUED | OUTPATIENT
Start: 2024-07-05 | End: 2024-07-05 | Stop reason: HOSPADM

## 2024-07-05 RX ORDER — ONDANSETRON HYDROCHLORIDE 2 MG/ML
4 INJECTION, SOLUTION INTRAVENOUS ONCE AS NEEDED
Status: DISCONTINUED | OUTPATIENT
Start: 2024-07-05 | End: 2024-07-05 | Stop reason: HOSPADM

## 2024-07-05 RX ORDER — FENTANYL CITRATE 50 UG/ML
50 INJECTION, SOLUTION INTRAMUSCULAR; INTRAVENOUS ONCE
Status: COMPLETED | OUTPATIENT
Start: 2024-07-05 | End: 2024-07-05

## 2024-07-05 RX ORDER — LABETALOL HYDROCHLORIDE 5 MG/ML
5 INJECTION, SOLUTION INTRAVENOUS ONCE AS NEEDED
Status: DISCONTINUED | OUTPATIENT
Start: 2024-07-05 | End: 2024-07-05 | Stop reason: HOSPADM

## 2024-07-05 RX ORDER — PROCHLORPERAZINE EDISYLATE 5 MG/ML
5 INJECTION INTRAMUSCULAR; INTRAVENOUS ONCE AS NEEDED
Status: DISCONTINUED | OUTPATIENT
Start: 2024-07-05 | End: 2024-07-05 | Stop reason: HOSPADM

## 2024-07-05 RX ORDER — HYDRALAZINE HYDROCHLORIDE 20 MG/ML
5 INJECTION INTRAMUSCULAR; INTRAVENOUS EVERY 30 MIN PRN
Status: DISCONTINUED | OUTPATIENT
Start: 2024-07-05 | End: 2024-07-05 | Stop reason: HOSPADM

## 2024-07-05 RX ORDER — FENTANYL CITRATE 50 UG/ML
25 INJECTION, SOLUTION INTRAMUSCULAR; INTRAVENOUS EVERY 5 MIN PRN
Status: DISCONTINUED | OUTPATIENT
Start: 2024-07-05 | End: 2024-07-05 | Stop reason: HOSPADM

## 2024-07-05 RX ORDER — FENTANYL CITRATE 50 UG/ML
50 INJECTION, SOLUTION INTRAMUSCULAR; INTRAVENOUS EVERY 5 MIN PRN
Status: DISCONTINUED | OUTPATIENT
Start: 2024-07-05 | End: 2024-07-05 | Stop reason: HOSPADM

## 2024-07-05 RX ORDER — ALBUTEROL SULFATE 0.83 MG/ML
2.5 SOLUTION RESPIRATORY (INHALATION) ONCE AS NEEDED
Status: DISCONTINUED | OUTPATIENT
Start: 2024-07-05 | End: 2024-07-05 | Stop reason: HOSPADM

## 2024-07-05 RX ORDER — ONDANSETRON HYDROCHLORIDE 2 MG/ML
INJECTION, SOLUTION INTRAVENOUS AS NEEDED
Status: DISCONTINUED | OUTPATIENT
Start: 2024-07-05 | End: 2024-07-05

## 2024-07-05 RX ORDER — DIPHENHYDRAMINE HYDROCHLORIDE 50 MG/ML
12.5 INJECTION INTRAMUSCULAR; INTRAVENOUS ONCE AS NEEDED
Status: DISCONTINUED | OUTPATIENT
Start: 2024-07-05 | End: 2024-07-05 | Stop reason: HOSPADM

## 2024-07-05 RX ORDER — LIDOCAINE HYDROCHLORIDE 10 MG/ML
INJECTION, SOLUTION EPIDURAL; INFILTRATION; INTRACAUDAL; PERINEURAL AS NEEDED
Status: DISCONTINUED | OUTPATIENT
Start: 2024-07-05 | End: 2024-07-05

## 2024-07-05 RX ORDER — MEPERIDINE HYDROCHLORIDE 25 MG/ML
12.5 INJECTION INTRAMUSCULAR; INTRAVENOUS; SUBCUTANEOUS EVERY 10 MIN PRN
Status: DISCONTINUED | OUTPATIENT
Start: 2024-07-05 | End: 2024-07-05 | Stop reason: HOSPADM

## 2024-07-05 RX ORDER — PROPOFOL 10 MG/ML
INJECTION, EMULSION INTRAVENOUS AS NEEDED
Status: DISCONTINUED | OUTPATIENT
Start: 2024-07-05 | End: 2024-07-05

## 2024-07-05 RX ORDER — SODIUM CHLORIDE, SODIUM LACTATE, POTASSIUM CHLORIDE, CALCIUM CHLORIDE 600; 310; 30; 20 MG/100ML; MG/100ML; MG/100ML; MG/100ML
100 INJECTION, SOLUTION INTRAVENOUS CONTINUOUS
Status: DISCONTINUED | OUTPATIENT
Start: 2024-07-05 | End: 2024-07-05 | Stop reason: HOSPADM

## 2024-07-05 RX ORDER — NORETHINDRONE AND ETHINYL ESTRADIOL 0.5-0.035
KIT ORAL AS NEEDED
Status: DISCONTINUED | OUTPATIENT
Start: 2024-07-05 | End: 2024-07-05

## 2024-07-05 RX ORDER — CEFAZOLIN SODIUM 2 G/100ML
2 INJECTION, SOLUTION INTRAVENOUS ONCE
Status: COMPLETED | OUTPATIENT
Start: 2024-07-05 | End: 2024-07-05

## 2024-07-05 RX ORDER — MIDAZOLAM HYDROCHLORIDE 1 MG/ML
2 INJECTION, SOLUTION INTRAMUSCULAR; INTRAVENOUS ONCE
Status: COMPLETED | OUTPATIENT
Start: 2024-07-05 | End: 2024-07-05

## 2024-07-05 RX ORDER — HYDROCODONE BITARTRATE AND ACETAMINOPHEN 5; 325 MG/1; MG/1
1 TABLET ORAL EVERY 6 HOURS PRN
Qty: 28 TABLET | Refills: 0 | Status: SHIPPED | OUTPATIENT
Start: 2024-07-05 | End: 2024-07-12

## 2024-07-05 RX ADMIN — FENTANYL CITRATE 50 MCG: 50 INJECTION INTRAMUSCULAR; INTRAVENOUS at 08:29

## 2024-07-05 RX ADMIN — MIDAZOLAM 2 MG: 1 INJECTION INTRAMUSCULAR; INTRAVENOUS at 08:29

## 2024-07-05 RX ADMIN — SODIUM CHLORIDE, SODIUM LACTATE, POTASSIUM CHLORIDE, AND CALCIUM CHLORIDE 100 ML/HR: 600; 310; 30; 20 INJECTION, SOLUTION INTRAVENOUS at 08:15

## 2024-07-05 ASSESSMENT — PAIN - FUNCTIONAL ASSESSMENT
PAIN_FUNCTIONAL_ASSESSMENT: 0-10

## 2024-07-05 ASSESSMENT — PAIN SCALES - GENERAL
PAINLEVEL_OUTOF10: 0 - NO PAIN

## 2024-07-05 ASSESSMENT — COLUMBIA-SUICIDE SEVERITY RATING SCALE - C-SSRS
1. IN THE PAST MONTH, HAVE YOU WISHED YOU WERE DEAD OR WISHED YOU COULD GO TO SLEEP AND NOT WAKE UP?: NO
2. HAVE YOU ACTUALLY HAD ANY THOUGHTS OF KILLING YOURSELF?: NO
6. HAVE YOU EVER DONE ANYTHING, STARTED TO DO ANYTHING, OR PREPARED TO DO ANYTHING TO END YOUR LIFE?: NO

## 2024-07-05 NOTE — BRIEF OP NOTE
Date: 2024  OR Location: [facility]    Name: Chyna Jaramillo, : 2000, Age: 24 y.o., MRN: [id number], Sex: female    Diagnosis  Pre-op Diagnosis     * Closed fracture of right distal radius and ulna, initial encounter [S52.501A, S52.601A] Post-op Diagnosis     * Closed fracture of right distal radius and ulna, initial encounter [S52.501A, S52.601A]     Procedures  Open reduction internal fixation right distal radius /  2 hours  04071 - PA OPTX DSTL RADL I-ARTIC FX/EPIPHYSL SEP 3 FRAG      Surgeons      * Roland BIGGS Beucler - Primary    Resident/Fellow/Other Assistant:  Dominguez Karimi MD (PGY-5)  Eleanor Shell MD (PGY-4)    Procedure Summary  Anesthesia: Regional, General  ASA: I  Anesthesia Staff: Anesthesiologist: Silvano Mills MD  Estimated Blood Loss: 1 mL  Intra-op Medications:   Administrations occurring from 0905 to 1140 on 24:   Medication Name Total Dose   lactated Ringer's infusion 165 mL              Anesthesia Record               Intraprocedure I/O Totals          Intake    lactated Ringer's infusion 1000.00 mL    Total Intake 1000 mL          Specimen: No specimens collected     Staff:   Circulator: Sully Mcdaniel Person: Shivani Peterson Circulator: Bridgette    Findings: See operative dictation    Complications:  None; patient tolerated the procedure well.     Disposition: PACU - hemodynamically stable.  Condition: stable  Specimens Collected: No specimens collected  Attending Attestation:     Roland BIGGS Beucler  Phone Number: [phone]

## 2024-07-05 NOTE — PERIOPERATIVE NURSING NOTE
1115 Arrives to pacu 2 talking speech slurred lungs clear all sinus rhythm. Complaining needs to void Placed on bedpan. Dressing dry and intact Circulation checks within normal limits. Denies pain and nausea
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
## 2024-07-05 NOTE — ANESTHESIA POSTPROCEDURE EVALUATION
Patient: Chyna Jaramillo    Procedure Summary       Date: 07/05/24 Room / Location: [facility] / [facility]    Anesthesia Start: 0844 Anesthesia Stop: 1120    Procedure: Open reduction internal fixation right distal radius /  2 hours (Right: Wrist) Diagnosis:       Closed fracture of right distal radius and ulna, initial encounter      (Closed fracture of right distal radius and ulna, initial encounter [S52.501A, S52.601A])    Surgeons: Roland Nowak DO Responsible Provider: Silvano Mills MD    Anesthesia Type: general, regional ASA Status: 1            Anesthesia Type: general, regional    Vitals Value Taken Time   /69 07/05/24 1140   Temp 36.5 °C (97.7 °F) 07/05/24 1145   Pulse 77 07/05/24 1145   Resp 16 07/05/24 1145   SpO2 97 % 07/05/24 1145       Anesthesia Post Evaluation    Patient location during evaluation: PACU  Patient participation: complete - patient participated  Level of consciousness: awake  Pain management: adequate  Multimodal analgesia pain management approach  Airway patency: patent  Cardiovascular status: acceptable and hemodynamically stable  Respiratory status: acceptable and spontaneous ventilation  Hydration status: euvolemic  Postoperative Nausea and Vomiting: none  Comments: No nausea or vomiting        There were no known notable events for this encounter.

## 2024-07-05 NOTE — OP NOTE
Open reduction internal fixation right distal radius /  2 hours (R) Operative Note     Date: 2024  OR Location: [facility]    Name: Chyna Jaramillo, : 2000, Age: 24 y.o., MRN: [id number], Sex: female    Diagnosis  Pre-op Diagnosis     * Closed fracture of right distal radius and ulna, initial encounter [S52.501A, S52.601A] Post-op Diagnosis     * Closed fracture of right distal radius and ulna, initial encounter [S52.501A, S52.601A]     Procedures  Open reduction internal fixation right distal radius /  2 hours  07789 - [place] OPTX DSTL RADL I-ARTIC FX/EPIPHYSL SEP 3 FRAG      Surgeons      * Will B Beucler - Primary    Resident/Fellow/Other Assistant:  Surgeons and Role:  * No surgeons found with a matching role *    Procedure Summary  Anesthesia: Regional, General  ASA: I  Anesthesia Staff: Anesthesiologist: Silvano Mills MD  Estimated Blood Loss: 5 mL  Intra-op Medications:   Administrations occurring from 0905 to 1140 on 24:   Medication Name Total Dose   lactated Ringer's infusion 165 mL              Anesthesia Record               Intraprocedure I/O Totals          Intake    lactated Ringer's infusion 1000.00 mL    Total Intake 1000 mL          Specimen: No specimens collected     Staff:   Circulator: Sully Mcdaniel Person: Shivani Peterson Circulator: Bridgette         Drains and/or Catheters: * None in log *    Tourniquet Times:     Total Tourniquet Time Documented:  Arm - Upper (Right) - 117 minutes  Total: Arm - Upper (Right) - 117 minutes      Implants:  Implants       Type Name Action Serial No.      Screw GUIDEWIRE, 1.1MM X 150MM. TROCAR TIP - SN/A - [id number] Implanted N/A     Screw PLATE, RADIUS DORS/DIST 2.4 -90D 2H/4H INTMD-COL VA-LCP - SN/A - [id number] Implanted N/A     Screw SCREW, CORTEX SELF TAPPING T8 SDR 2.4MM X 14MM - SN/A - [id number] Wasted N/A     Screw SCREW, CORTEX SELF TAPPING T8 SDR 2.4MM X 12MM - SNA - [id number] Implanted NA     Screw SCREW, CORTEX SELF TAPPING T8 SDR 2.4MM X  12MM - SNA - [id number] Implanted NA     Screw SCREW, CORTEX SELF TAPPING T8 SDR 2.4MM X 12MM - SNA - [id number] Implanted NA     Screw SCREW, VA 2.4 X 18 LOCK STARDRIVE - SNA - [id number] Implanted NA     Screw SCREW, VA 2.4 X 16 LOCK STARDRIVE - SNA - [id number] Implanted NA              Findings: Displaced fracture of the radial styloid, volar ulnar corner and dorsal compartment.  The fracture already had callus present and the wrist was stable to stress testing and would not dislocate volarly with stress applied.  Therefore I do not feel that a bridge plate was required to stabilize the radiocarpal joint.    See other op note

## 2024-07-05 NOTE — PERIOPERATIVE NURSING NOTE
1135 Voided mod amount clear yellow urine per bedpan Denies pain taking sips of ginger ale. Denies nausea. Dressing dry and intact elevated with pillow ice bag on patient. SDS updated on patient progress.  1147 To SDS

## 2024-07-05 NOTE — ANESTHESIA PREPROCEDURE EVALUATION
Patient: Chyna Jaramillo    Procedure Information       Date/Time: 07/05/24 0905    Procedure: Open reduction internal fixation right distal radius /  2 hours (Right: Wrist)    Location: [facility] / [facility]    Surgeons: Will B Beucler, DO            Relevant Problems   Anesthesia (within normal limits)      Cardiac   (-) History of coronary artery bypass graft   (-) Pacemaker      Pulmonary   (-) Asthma (HHS-HCC)   (-) Chronic obstructive pulmonary disease (Multi)   (-) SHIREEN (obstructive sleep apnea)      Neuro   (-) CVA (cerebral vascular accident) (Multi)   (-) Seizures (Multi)      Endocrine   (-) Diabetes mellitus, type 2 (Multi)      Hematology   (-) Coagulopathy (Multi)       Clinical information reviewed:   Tobacco  Allergies  Meds   Med Hx  Surg Hx  OB Status  Fam Hx  Soc   Hx        NPO Detail:  No data recorded     Physical Exam    Airway  Mallampati: I  TM distance: >3 FB  Neck ROM: full  Comments: Small chin   Cardiovascular    Dental    Pulmonary    Abdominal            Anesthesia Plan    History of general anesthesia?: yes  History of complications of general anesthesia?: no    ASA 1     general and regional     intravenous induction   Postoperative administration of opioids is intended.  Anesthetic plan and risks discussed with patient.

## 2024-07-05 NOTE — ANESTHESIA PROCEDURE NOTES
Airway  Date/Time: 7/5/2024 8:50 AM  Urgency: elective    Airway not difficult    Staffing  Performed: attending   Authorized by: Silvano Mills MD    Performed by: Silvano Mills MD  Patient location during procedure: OR    Indications and Patient Condition  Indications for airway management: anesthesia  Spontaneous ventilation: present  Sedation level: deep  Preoxygenated: yes  Mask difficulty assessment: 0 - not attempted    Final Airway Details  Final airway type: supraglottic airway      Successful airway: classic  Size 4     Number of attempts at approach: 1

## 2024-07-05 NOTE — ANESTHESIA PROCEDURE NOTES
Peripheral Block    Patient location during procedure: holding area  Start time: 7/5/2024 8:35 AM  End time: 7/5/2024 8:38 AM  Reason for block: at surgeon's request and post-op pain management  Staffing  Performed: attending   Authorized by: Silvano Mills MD    Performed by: Silvano Mills MD  Preanesthetic Checklist  Completed: patient identified, IV checked, site marked, risks and benefits discussed, surgical consent, monitors and equipment checked, pre-op evaluation and timeout performed   Timeout performed at: 7/5/2024 8:29 AM  Peripheral Block  Patient position: sitting  Prep: ChloraPrep  Patient monitoring: heart rate, cardiac monitor and continuous pulse ox  Block type: supraclavicular  Laterality: right  Injection technique: single-shot  Guidance: ultrasound guided  Local infiltration: ropivacaine  Infiltration strength: 0.5 %  Dose: 20 mL  Needle  Needle gauge: 20 G  Needle length: 5 cm  Needle localization: ultrasound guidance  Assessment  Injection assessment: negative aspiration for heme, no paresthesia on injection, incremental injection, local visualized surrounding nerve on ultrasound and transient paresthesias  Paresthesia pain: immediately resolved  Heart rate change: no  Slow fractionated injection: no

## 2024-07-05 NOTE — DISCHARGE INSTRUCTIONS
Post-Operative Instructions   Dr. Roland Dialloucler   [phone]     Dressing: You have a splint covering your operative site:    Do not remove the splint until your next scheduled appointment with your surgeon or therapist. Keep your splint clean and dry. The splint will be removed at that appointment     Showering: You may shower following surgery but please adhere to above instructions regarding the dressing. If showering with bandage / splint in place please ensure that it is kept dry and covered while bathing. There are commercially available cast bags that can be used to protect your dressing while showering. If using garbage bags please make sure that there are no holes in the bag and that the bag has been sealed above the dressing. If the bandage gets wet you must contact your surgeon's office to make arrangements to be seen to have bandage changed.    Ice / Elevation: The application of ice to your surgical site after surgery will help with pain control and swelling. This can be very effective for 48-72 hours after surgery. Please be careful to avoid getting bandage wet from a leaky ice bag. Please be advised that the ice may need to be applied for longer periods of time for the cooling effect to penetrate the post-operative dressing. Elevation of the operative site above the level of the heart as much as possible for the first 48-72 hours after surgery. Use your sling if you have been provided one while standing or walking. If your fingers are not included in the dressing you are encouraged to attempt finger range of motion as this will help with your hand swelling and ultimate recovery.     Pain Medication: If you received a regional anesthetic on the day of your surgery your arm and hand may be numb for up to 24 hours after surgery. It is important to wear your sling while the block is still effective in order to protect your arm. It is advisable to take pain medications prior to going to sleep in case  the regional anesthesia medication wears off while you are sleeping. Take your pain medications as directed. Narcotic pain medications can cause lethargy, nausea and constipation. Please contact your surgeon's office and discontinue the medication if these symptoms  become problematic. Eating a regular diet, drinking fluids and walking can  help with constipation from these medications.   Avoid alcohol consumption     Additional pain control options:    You are encouraged to take over the counter medications like Advil / Motrin / Ibuprofen / Aleve in addition to your prescribed pain medications after surgery     Smoking / Tobacco: Tobacco use is known to interfere with wound and fracture healing and increase post-operative pain. It can also increase your risk of poor outcomes following surgery. Please do not use tobacco or nicotine products following your surgery. This includes smokeless tobacco, or nicotine replacement products (patches, gum, etc.)     Driving: It is not advisable to operate a vehicle while using narcotic pain medications. Additionally, please be advised that you are likely to have challenges operating a car or motorcycle while you are still in your postoperative dressing and this could increase your risk of being involved in an accident and being cited for driving while physically impaired.     Warning Signs: Observe your arm / hand and incision site (if visible) for increased redness, inflammation, drainage, odor or pain that is unrelieved by rest, elevation or medication. Please contact your surgeon's office immediately if you develop any of these issues or if you develop a fever greater than 101°.     Follow Up Appointments: You do not yet have a post-operative appointment scheduled. Please contact Dr. Nowak's office at [phone] to schedule this appointment.

## 2024-07-19 ENCOUNTER — HOSPITAL ENCOUNTER (OUTPATIENT)
Dept: RADIOLOGY | Facility: CLINIC | Age: 24
Discharge: HOME | End: 2024-07-19
Payer: COMMERCIAL

## 2024-07-19 ENCOUNTER — OFFICE VISIT (OUTPATIENT)
Dept: ORTHOPEDIC SURGERY | Facility: CLINIC | Age: 24
End: 2024-07-19
Payer: COMMERCIAL

## 2024-07-19 DIAGNOSIS — S52.601A CLOSED FRACTURE OF DISTAL ENDS OF RIGHT RADIUS AND ULNA, INITIAL ENCOUNTER: Primary | ICD-10-CM

## 2024-07-19 DIAGNOSIS — S52.601A CLOSED FRACTURE OF DISTAL ENDS OF RIGHT RADIUS AND ULNA, INITIAL ENCOUNTER: ICD-10-CM

## 2024-07-19 DIAGNOSIS — S52.501A CLOSED FRACTURE OF DISTAL ENDS OF RIGHT RADIUS AND ULNA, INITIAL ENCOUNTER: ICD-10-CM

## 2024-07-19 DIAGNOSIS — S52.501A CLOSED FRACTURE OF DISTAL ENDS OF RIGHT RADIUS AND ULNA, INITIAL ENCOUNTER: Primary | ICD-10-CM

## 2024-07-19 PROCEDURE — 99024 POSTOP FOLLOW-UP VISIT: CPT | Performed by: STUDENT IN AN ORGANIZED HEALTH CARE EDUCATION/TRAINING PROGRAM

## 2024-07-19 PROCEDURE — 73110 X-RAY EXAM OF WRIST: CPT | Mod: RT

## 2024-07-19 ASSESSMENT — PAIN SCALES - GENERAL: PAINLEVEL_OUTOF10: 3

## 2024-07-19 ASSESSMENT — PAIN DESCRIPTION - DESCRIPTORS: DESCRIPTORS: ACHING

## 2024-07-19 ASSESSMENT — PAIN - FUNCTIONAL ASSESSMENT: PAIN_FUNCTIONAL_ASSESSMENT: 0-10

## 2024-07-19 NOTE — LETTER
July 19, 2024     Patient: Chyna Jaramillo   YOB: 2000   Date of Visit: 7/19/2024       To Whom It May Concern:    Chyna Jaramillo was seen in clinic with Dr. Roland Nowak today 7/19/2024. She can return to work on July 29, 2024 with restrictions, no pushing, pulling or lifting over 2 pounds until further evaluated.    If you have any questions or concerns, please don't hesitate to call.         Sincerely,        Roland Nowak, DO    CC: No Recipients

## 2024-07-19 NOTE — PROGRESS NOTES
CHIEF COMPLAINT:  Status post ORIF right distal radius fracture  DOS 7/5/2024     HISTORY OF PRESENT ILLNESS:  Patient is a 24-year-old pleasant female who presents today for postoperative follow-up status post fixation of a right distal radius fracture.  The patient reports minimal problems after surgery. Patient reports no issues with their postoperative orthosis.  Denies numbness, tingling, or paresthesias of the hand. Patient is not in hand therapy. No other new issues. Denies fevers, chills, problems with their wound.     PHYSICAL EXAM:  Well-appearing and in no apparent distress.  Alert and oriented.    Exam of the wrist demonstrates a well healing dorsal forearm incision without erythema or drainage.  Makes a near complete fist with all fingers.    AIN, PIN, ulnar motor intact.  Median, ulnar, radial sensation is 5mm throughout.  Brisk cap refill to all digits.     DIAGNOSTIC STUDIES:  Radiographs of the right wrist ordered, obtained, and interpreted independently in clinic by me today demonstrate preserved postoperative alignment status post fixation of a displaced distal radius fracture. Hardware in place without evidence of loosening or failure. No new acute bony findings.     IMPRESSION:  2 weeks status-post ORIF right distal radius fracture overall doing well postoperatively     Sutures are removed today in the office without complication. Steri-Strips were applied to the wound.  Patient was instructed that she may shower at this point, but should avoid fully submerging the wound for an additional 1 week.     Range of motion and use of the hand are encouraged, although I have asked the patient to avoid heavy use of the hand, heavy gripping and squeezing, and avoidance of direct pressure on the wound/scar for an additional 2-3 weeks time to allow for full wound maturation.  Gentle scar massage to be initiated in approximately 1 weeks time, and the patient is instructed in this regard.      PLAN:  Follow  up in approximately 4 weeks time. X-rays of right wrist out of splint on return.

## 2024-07-22 ENCOUNTER — APPOINTMENT (OUTPATIENT)
Dept: ORTHOPEDIC SURGERY | Facility: HOSPITAL | Age: 24
End: 2024-07-22
Payer: COMMERCIAL

## 2024-07-22 NOTE — OP NOTE
Open reduction internal fixation right distal radius /  2 hours (R) Operative Note     Date: 2024  OR Location: [facility]    Name: Chyna Jaramillo, : 2000, Age: 24 y.o., MRN: [id number], Sex: female    Diagnosis  Pre-op Diagnosis      * Closed fracture of right distal radius and ulna, initial encounter [S52.501A, S52.601A] Post-op Diagnosis     * Closed fracture of right distal radius and ulna, initial encounter [S52.501A, S52.601A]     Procedures  Open reduction internal fixation right distal radius /  2 hours  28927 - VT OPTX DSTL RADL I-ARTIC FX/EPIPHYSL SEP 3 FRAG      Surgeons      * Will B Beucler - Primary    Resident/Fellow/Other Assistant:  Surgeons and Role:  * No surgeons found with a matching role *    Procedure Summary  Anesthesia: Regional, General  ASA: I  Anesthesia Staff: Anesthesiologist: Silvano Mills MD  Estimated Blood Loss: 10mL  Intra-op Medications:   Administrations occurring from 0905 to 1140 on 24:   Medication Name Total Dose   lactated Ringer's infusion 165 mL              Anesthesia Record               Intraprocedure I/O Totals          Intake    lactated Ringer's infusion 1200.00 mL    Total Intake 1200 mL          Specimen: No specimens collected     Staff:   Circulator: Sully Mcdaniel Person: Shivani Peterson Circulator: Bridgette         Drains and/or Catheters: * None in log *    Tourniquet Times:     Total Tourniquet Time Documented:  Arm - Upper (Right) - 117 minutes  Total: Arm - Upper (Right) - 117 minutes      Implants:  Implants       Type Name Action Serial No.      Screw GUIDEWIRE, 1.1MM X 150MM. TROCAR TIP - SN/A - [id number] Implanted N/A     Screw PLATE, RADIUS DORS/DIST 2.4 -90D 2H/4H INTMD-COL VA-LCP - SN/A - [id number] Implanted N/A     Screw SCREW, CORTEX SELF TAPPING T8 SDR 2.4MM X 14MM - SN/A - [id number] Wasted N/A     Screw SCREW, CORTEX SELF TAPPING T8 SDR 2.4MM X 12MM - SNA - [id number] Implanted NA     Screw SCREW, CORTEX SELF TAPPING T8 SDR 2.4MM X  12MM - SNA - [id number] Implanted NA     Screw SCREW, CORTEX SELF TAPPING T8 SDR 2.4MM X 12MM - SNA - [id number] Implanted NA     Screw SCREW, VA 2.4 X 18 LOCK STARDRIVE - SNA - [id number] Implanted NA     Screw SCREW, VA 2.4 X 16 LOCK STARDRIVE - SNA - [id number] Implanted NA     Screw 3.0 HEADLESS SCREW Implanted NA     Screw SCREW, CORTEX SELF TAPPING T8 SDR 2.4MM X 16MM - [id number] Implanted               Findings: Displaced fracture of the radial styloid, dorsal ulnar corner, and nondisplaced volar lip fracture    Indications: Chyna Jaramillo is an 24 y.o. female who is having surgery for Closed fracture of right distal radius and ulna, initial encounter [S52.660A, S52.189Y].  Patient is a pleasant 24-year-old female who sustained a significant right distal radius fracture which was closed reduced.  The fracture had displaced fragments and was intra-articular which would best be treated with surgical intervention.    The patient was seen in the preoperative area. The risks, benefits, complications, treatment options, non-operative alternatives, expected recovery and outcomes were discussed with the patient. The possibilities of reaction to medication, pulmonary aspiration, injury to surrounding structures, bleeding, recurrent infection, the need for additional procedures, failure to diagnose a condition, and creating a complication requiring transfusion or operation were discussed with the patient. The patient concurred with the proposed plan, giving informed consent.  The site of surgery was properly noted/marked if necessary per policy. The patient has been actively warmed in preoperative area. Preoperative antibiotics have been ordered and given within 1 hours of incision. Venous thrombosis prophylaxis are not indicated.    Procedure Details: Indications for procedure:     I met and evaluated the patient in the preoperative holding area today prior to the patient receiving any sedation or other medications  which may alter their mental status. I confirmed their identity via the facility's protocol. I reviewed the patient's history, physical exam, and recommendation for surgery. The indications for surgical versus nonsurgical management of the condition were discussed, including the inherent risks, benefits, prognosis of the condition.  The risks of surgery include, but are not limited to, pain, bleeding, infection, tendon damage, nerve damage, vessel damage, and need for further surgery.  The risks also include wound healing problems, decreased long-term functionality of the wrist and hand, tendon irritation, tendon rupture, and possible need for therapy for an optimum outcome.  There is a risk of complex regional pain syndrome, incomplete recovery, incomplete relief or worsening of symptoms, and recurrence.  We also discussed that medical complications are possible during and after surgery related to either anesthesia or the surgical procedure itself. Specific discussion of the risks of the proposed anesthetic plan will be discussed by the patient's anesthesia provider. All risks were reviewed in layman´s terms. The patient was given ample time to ask appropriate questions and appeared to be satisfied with the answers provided. All questions were answered and no guarantees have been given regarding the patient's condition and treatment recommendations. The general plan for the postoperative rehabilitation course, including possible need for therapy, was reviewed at great length. Informed consent was signed by all appropriate parties. The surgical site was marked in ink by myself.        Procedure in Detail:  The patient was transported to the operating room and placed in the supine position on the operating table. All extremities and prominences were appropriately padded. Adequate anesthesia was induced. A non-sterile tourniquet was applied high on the right arm. The right arm was prepped and draped in standard  sterile fashion. A time-out was conducted prior to beginning the operation to confirm the patient's identity, planned procedures, laterality of procedures, and that appropriate antibiotics had been administered within 30 minutes of incision. The right upper extremity was exsanguinated with an Esmarch bandage, and the tourniquet was inflated to 250mmHg.     A longitudinal incision was made just ulnar to Leann's tubercle in line with the third metacarpal shaft.  Full-thickness skin flaps were elevated.  Blunt dissection was carried down to the extensor retinaculum.  Care was taken to preserve the dorsal radial sensory branches.  The third dorsal compartment was elevated and retracted radially.  The second dorsal compartment was released and retracted radially.  The fourth dorsal compartment was retracted ulnarly.  A PIN neurectomy was performed.  The dorsal fracture was identified.  This had a very minor step-off of about 1 to 2 mm.  Before this was fixated attention was then focused on the radial styloid as there was a more significant step-off there.      The radial styloid was approached through the dorsal longitudinal incision.  Dissection was carried radially.  The dorsal sensory branch was identified and protected.  First dorsal compartment was released and retracted palmarly or radially.  The second dorsal compartment was retracted ulnarly.  The fracture was identified.  Periosteum was released approximately 2 mm on each side of the fracture fragment.  The fracture was booked open with a freer elevator.  The wound was irrigated copiously and any fracture hematoma was removed.  Once the fracture had been immobilized it was reduced with a dental pick.  A K wire from the radial styloid placed retrograde was then placed.  This held the reduction well.  An appropriate sized cannulated drill was placed over the K wire and drilled for a headless screw.  A Synthes 3.0 mm headless screw was placed over the K wire.   This provided good fixation to the radial styloid.    Attention was then focused back to the dorsal fracture fragment.  This fracture fragment was elevated and reduced back into place with a K wire.  A fragment specific dorsal plate was then placed over this fracture.  3 proximal screws were then placed and 2 screws were placed within the fracture distal fracture.  This fixated the fracture well.    Fluoroscopy was brought in demonstrating near anatomic alignment.  The radiocarpal joint was stressed with multiple fluoroscopic images taken.  The carpus was stable and did not translate on the radius.  There was no concerns for a radiocarpal dislocation.  A decision not to fix the volar fracture fragments or spanned the joint was made based on the stability after a stress test.  This fracture was approximately 3 weeks old and likely already had some callus formation volarly.  The wounds were copiously irrigated.  The third dorsal compartment was left out of its subcompartment.  This was placed radially.  The extensor retinaculum was split and a portion was placed underneath the extensor tendons to protect the tendons from the plate.  The distal part of the extensor retinaculum was then placed over the extensor retinaculum to prevent bowstringing.  This was closed with 0 Vicryl suture.  The skin was closed with 2-0 Vicryl suture nylon suture.  A sterile dressing was applied and a bulky splint was placed.     The patient was transferred to the hospital bed and transported to the post-anesthesia care unit in good condition having tolerated the procedure well. All sponge, needle, and instrument counts were correct x2. Postoperatively, the digits were pink and well perfused and the hand compartments and soft tissues were supple. No complications were apparent.    Postoperative Plan:  The patient will be nonweightbearing on their operative site. Their dressing will be removed in office.   They will return to clinic in 2  weeks. X-rays are not needed on return visit.  Complications:  None; patient tolerated the procedure well.    Disposition: PACU - hemodynamically stable.  Condition: stable         Additional Details: None    Attending Attestation: I performed the procedure.    Roland Dialloucleedwardo  Phone Number: [phone]

## 2024-08-16 ENCOUNTER — APPOINTMENT (OUTPATIENT)
Dept: ORTHOPEDIC SURGERY | Facility: CLINIC | Age: 24
End: 2024-08-16
Payer: COMMERCIAL

## 2024-08-16 ENCOUNTER — HOSPITAL ENCOUNTER (OUTPATIENT)
Dept: RADIOLOGY | Facility: CLINIC | Age: 24
Discharge: HOME | End: 2024-08-16
Payer: COMMERCIAL

## 2024-08-16 DIAGNOSIS — S69.91XA WRIST INJURY, RIGHT, INITIAL ENCOUNTER: ICD-10-CM

## 2024-08-16 DIAGNOSIS — S69.91XA WRIST INJURY, RIGHT, INITIAL ENCOUNTER: Primary | ICD-10-CM

## 2024-08-16 PROCEDURE — 99024 POSTOP FOLLOW-UP VISIT: CPT | Performed by: STUDENT IN AN ORGANIZED HEALTH CARE EDUCATION/TRAINING PROGRAM

## 2024-08-16 PROCEDURE — 73110 X-RAY EXAM OF WRIST: CPT | Mod: RT

## 2024-08-16 NOTE — PROGRESS NOTES
CHIEF COMPLAINT:  Status post ORIF right distal radius fracture  DOS 7/5/2024     HISTORY OF PRESENT ILLNESS:  Patient is a 24-year-old pleasant female who presents today for postoperative follow-up status post fixation of a right distal radius fracture.  The patient reports minimal problems after surgery. Patient reports no issues with their postoperative orthosis.  Denies numbness, tingling, or paresthesias of the hand. Patient is not in hand therapy. No other new issues. Denies fevers, chills, problems with their wound.     PHYSICAL EXAM:  Well-appearing and in no apparent distress.  Alert and oriented.    Exam of the wrist demonstrates a well healing dorsal forearm incision without erythema or drainage.  Makes a complete fist with all fingers.    AIN, PIN, ulnar motor intact.  Median, ulnar, radial sensation is 5mm throughout.  Brisk cap refill to all digits.     DIAGNOSTIC STUDIES:  Radiographs of the right wrist ordered, obtained, and interpreted independently in clinic by me today demonstrate preserved postoperative alignment status post fixation of a displaced distal radius fracture. Hardware in place without evidence of loosening or failure. No new acute bony findings.     IMPRESSION:  6 weeks status-post ORIF right distal radius fracture overall doing well postoperatively        Range of motion and use of the hand are encouraged.  I have placed an order for occupational hand therapy to work on wrist range of motion.  She may return to work without restrictions.  Gentle scar massage to be initiated in approximately 1 weeks time, and the patient is instructed in this regard.      PLAN:  Follow up in approximately 6 weeks time. X-rays of right wrist out of splint on return.

## 2024-09-27 ENCOUNTER — APPOINTMENT (OUTPATIENT)
Dept: ORTHOPEDIC SURGERY | Facility: CLINIC | Age: 24
End: 2024-09-27
Payer: COMMERCIAL

## 2025-06-30 ENCOUNTER — APPOINTMENT (OUTPATIENT)
Dept: OPHTHALMOLOGY | Facility: CLINIC | Age: 25
End: 2025-06-30
Payer: COMMERCIAL

## 2025-06-30 DIAGNOSIS — H52.13 MYOPIA OF BOTH EYES: Primary | ICD-10-CM

## 2025-06-30 PROCEDURE — 92015 DETERMINE REFRACTIVE STATE: CPT | Performed by: STUDENT IN AN ORGANIZED HEALTH CARE EDUCATION/TRAINING PROGRAM

## 2025-06-30 PROCEDURE — 92004 COMPRE OPH EXAM NEW PT 1/>: CPT | Performed by: STUDENT IN AN ORGANIZED HEALTH CARE EDUCATION/TRAINING PROGRAM

## 2025-06-30 ASSESSMENT — ENCOUNTER SYMPTOMS
MUSCULOSKELETAL NEGATIVE: 0
PSYCHIATRIC NEGATIVE: 0
EYES NEGATIVE: 0
HEMATOLOGIC/LYMPHATIC NEGATIVE: 0
ALLERGIC/IMMUNOLOGIC NEGATIVE: 0
ENDOCRINE NEGATIVE: 0
CARDIOVASCULAR NEGATIVE: 0
RESPIRATORY NEGATIVE: 0
NEUROLOGICAL NEGATIVE: 0
GASTROINTESTINAL NEGATIVE: 0
CONSTITUTIONAL NEGATIVE: 0

## 2025-06-30 ASSESSMENT — REFRACTION_MANIFEST
OS_SPHERE: -1.75
OS_SPHERE: -2.25
OD_AXIS: 095
OD_AXIS: 090
OD_SPHERE: -1.75
OS_CYLINDER: SPHERE
OD_CYLINDER: -0.50
OD_CYLINDER: -0.50
OS_CYLINDER: SPHERE
METHOD_AUTOREFRACTION: 1
OD_SPHERE: -1.50

## 2025-06-30 ASSESSMENT — CONF VISUAL FIELD
OS_SUPERIOR_TEMPORAL_RESTRICTION: 0
OD_SUPERIOR_TEMPORAL_RESTRICTION: 0
OD_INFERIOR_TEMPORAL_RESTRICTION: 0
METHOD: COUNTING FINGERS
OS_NORMAL: 1
OD_SUPERIOR_NASAL_RESTRICTION: 0
OS_INFERIOR_TEMPORAL_RESTRICTION: 0
OS_SUPERIOR_NASAL_RESTRICTION: 0
OS_INFERIOR_NASAL_RESTRICTION: 0
OD_INFERIOR_NASAL_RESTRICTION: 0
OD_NORMAL: 1

## 2025-06-30 ASSESSMENT — EXTERNAL EXAM - RIGHT EYE: OD_EXAM: NORMAL

## 2025-06-30 ASSESSMENT — CUP TO DISC RATIO
OD_RATIO: .30
OS_RATIO: .30

## 2025-06-30 ASSESSMENT — TONOMETRY
OS_IOP_MMHG: 14
OD_IOP_MMHG: 12
IOP_METHOD: GOLDMANN APPLANATION

## 2025-06-30 ASSESSMENT — SLIT LAMP EXAM - LIDS
COMMENTS: NORMAL
COMMENTS: NORMAL

## 2025-06-30 ASSESSMENT — VISUAL ACUITY
OD_SC: 20/30
METHOD: SNELLEN - LINEAR
OD_PH_SC+: +2
OS_SC: 20/40
OD_PH_SC: 20/30
OS_PH_SC: 20/25
OS_PH_SC+: +2

## 2025-06-30 ASSESSMENT — EXTERNAL EXAM - LEFT EYE: OS_EXAM: NORMAL

## 2025-06-30 NOTE — PROGRESS NOTES
Assessment/Plan   Diagnoses and all orders for this visit:  Myopia of both eyes  -New spec rx released today per patient request. Ocular health wnl for age OU. Monitor 1 year or sooner prn. Refraction billed today.      RTC 1 year for annual with DIANE and WALT

## (undated) DEVICE — GOWN, SURGICAL, SIRUS, NON REINFORCED, LARGE

## (undated) DEVICE — PADDING, UNDERCAST, WEBRIL, 3 IN X 4 YD, REG, NS

## (undated) DEVICE — DRILL BIT, 1.8MM W/DEPTH MARKER/QC/110MM

## (undated) DEVICE — TUBING, SUCTION, 6MM X 10, CLEAN N-COND

## (undated) DEVICE — DRESSING, NON-ADHERENT, 3 X 3 IN, STERILE

## (undated) DEVICE — DRILL BIT, 2.0MM X 145MM, CANNULATED

## (undated) DEVICE — BANDAGE, ELASTIC, MATRIX, SELF-CLOSURE, 2 IN X 5 YD, LF

## (undated) DEVICE — Device

## (undated) DEVICE — PENCIL, ELECTROSURG, W/BUTTON SWITCH & HOLSTER, EZ CLEAN, DISP

## (undated) DEVICE — GLOVE, SURGICAL, PROTEXIS PI , 7.5, PF, LF

## (undated) DEVICE — CUFF, TOURNIQUET, 18 X 4, DUAL PORT/SNGL BLADDER, DISP, LF

## (undated) DEVICE — SUTURE, MONOCRYL, 4-0, 27 IN, PS-2, UNDYED

## (undated) DEVICE — SUTURE, VICRYL, 4-0, 70CM, TAPER SH

## (undated) DEVICE — MEGADYNE ADULT ELECTRODE, DUAL PLATE, W/ 3M (10') PRE-ATTACHED CORD

## (undated) DEVICE — APPLICATOR, PREP, ONE-STEP, ANTIMICROBIAL, CHLORAPREP, TINTED, 10.5ML

## (undated) DEVICE — NEEDLE, ELECTRODE, ELECTROSURGICAL, INSULATED

## (undated) DEVICE — BLANKET, LOWER BODY, VHA PLUS, ADULT

## (undated) DEVICE — SOLUTION, IRRIGATION, X RX SODIUM CHL 0.9%, 1000ML BTL

## (undated) DEVICE — SCREW, CORTEX SELF TAPPING T8 SDR 2.4MM X 14MM: Type: IMPLANTABLE DEVICE | Site: WRIST | Status: NON-FUNCTIONAL